# Patient Record
Sex: FEMALE | Race: WHITE | NOT HISPANIC OR LATINO | RURAL
[De-identification: names, ages, dates, MRNs, and addresses within clinical notes are randomized per-mention and may not be internally consistent; named-entity substitution may affect disease eponyms.]

---

## 2024-11-03 ENCOUNTER — HOSPITAL ENCOUNTER (EMERGENCY)
Facility: HOSPITAL | Age: 39
Discharge: HOME | End: 2024-11-03
Attending: SPECIALIST
Payer: COMMERCIAL

## 2024-11-03 VITALS
RESPIRATION RATE: 17 BRPM | TEMPERATURE: 98 F | WEIGHT: 205.13 LBS | HEART RATE: 65 BPM | SYSTOLIC BLOOD PRESSURE: 135 MMHG | OXYGEN SATURATION: 98 % | HEIGHT: 67 IN | BODY MASS INDEX: 32.2 KG/M2 | DIASTOLIC BLOOD PRESSURE: 83 MMHG

## 2024-11-03 DIAGNOSIS — Q87.40 MARFAN'S SYNDROME: ICD-10-CM

## 2024-11-03 DIAGNOSIS — R07.89 CHEST PAIN, MID STERNAL: ICD-10-CM

## 2024-11-03 DIAGNOSIS — G89.29 CHRONIC IDIOPATHIC PAIN SYNDROME: ICD-10-CM

## 2024-11-03 DIAGNOSIS — R07.9 CHEST PAIN: Primary | ICD-10-CM

## 2024-11-03 LAB
ALBUMIN SERPL BCP-MCNC: 4.4 G/DL (ref 3.5–5)
ALBUMIN/GLOB SERPL: 1.2 {RATIO}
ALP SERPL-CCNC: 99 U/L (ref 37–98)
ALT SERPL W P-5'-P-CCNC: 24 U/L (ref 13–56)
AMPHET UR QL SCN: NEGATIVE
ANION GAP SERPL CALCULATED.3IONS-SCNC: 11 MMOL/L (ref 7–16)
APTT PPP: 33.5 SECONDS (ref 25.2–37.3)
AST SERPL W P-5'-P-CCNC: 16 U/L (ref 15–37)
BARBITURATES UR QL SCN: NEGATIVE
BASOPHILS # BLD AUTO: 0.05 K/UL (ref 0–0.2)
BASOPHILS NFR BLD AUTO: 1 % (ref 0–1)
BENZODIAZ METAB UR QL SCN: NEGATIVE
BILIRUB SERPL-MCNC: 0.2 MG/DL (ref ?–1.2)
BILIRUB UR QL STRIP: NEGATIVE
BUN SERPL-MCNC: 15 MG/DL (ref 7–18)
BUN/CREAT SERPL: 14 (ref 6–20)
CALCIUM SERPL-MCNC: 9.1 MG/DL (ref 8.5–10.1)
CANNABINOIDS UR QL SCN: NEGATIVE
CHLORIDE SERPL-SCNC: 92 MMOL/L (ref 98–107)
CLARITY UR: CLEAR
CO2 SERPL-SCNC: 32 MMOL/L (ref 21–32)
COCAINE UR QL SCN: NEGATIVE
COLOR UR: YELLOW
CREAT SERPL-MCNC: 1.05 MG/DL (ref 0.55–1.02)
DIFFERENTIAL METHOD BLD: ABNORMAL
EGFR (NO RACE VARIABLE) (RUSH/TITUS): 69 ML/MIN/1.73M2
EOSINOPHIL # BLD AUTO: 0.17 K/UL (ref 0–0.5)
EOSINOPHIL NFR BLD AUTO: 3.4 % (ref 1–4)
ERYTHROCYTE [DISTWIDTH] IN BLOOD BY AUTOMATED COUNT: 15.7 % (ref 11.5–14.5)
GLOBULIN SER-MCNC: 3.7 G/DL (ref 2–4)
GLUCOSE SERPL-MCNC: 66 MG/DL (ref 74–106)
GLUCOSE UR STRIP-MCNC: NEGATIVE MG/DL
HCT VFR BLD AUTO: 31.8 % (ref 38–47)
HGB BLD-MCNC: 10.3 G/DL (ref 12–16)
IMM GRANULOCYTES # BLD AUTO: 0.01 K/UL (ref 0–0.04)
IMM GRANULOCYTES NFR BLD: 0.2 % (ref 0–0.4)
INR BLD: 0.99
KETONES UR STRIP-SCNC: NEGATIVE MG/DL
LACTATE SERPL-SCNC: 1.1 MMOL/L (ref 0.4–2)
LEUKOCYTE ESTERASE UR QL STRIP: NEGATIVE
LYMPHOCYTES # BLD AUTO: 2.38 K/UL (ref 1–4.8)
LYMPHOCYTES NFR BLD AUTO: 48 % (ref 27–41)
MAGNESIUM SERPL-MCNC: 1.9 MG/DL (ref 1.7–2.3)
MCH RBC QN AUTO: 24.3 PG (ref 27–31)
MCHC RBC AUTO-ENTMCNC: 32.4 G/DL (ref 32–36)
MCV RBC AUTO: 75 FL (ref 80–96)
MONOCYTES # BLD AUTO: 0.44 K/UL (ref 0–0.8)
MONOCYTES NFR BLD AUTO: 8.9 % (ref 2–6)
MPC BLD CALC-MCNC: 9.5 FL (ref 9.4–12.4)
NEUTROPHILS # BLD AUTO: 1.91 K/UL (ref 1.8–7.7)
NEUTROPHILS NFR BLD AUTO: 38.5 % (ref 53–65)
NITRITE UR QL STRIP: NEGATIVE
NRBC # BLD AUTO: 0 X10E3/UL
NRBC, AUTO (.00): 0 %
NT-PROBNP SERPL-MCNC: 540 PG/ML (ref 1–125)
OPIATES UR QL SCN: NEGATIVE
PCP UR QL SCN: NEGATIVE
PH UR STRIP: 5.5 PH UNITS
PLATELET # BLD AUTO: 312 K/UL (ref 150–400)
POTASSIUM SERPL-SCNC: 3.6 MMOL/L (ref 3.5–5.1)
PROT SERPL-MCNC: 8.1 G/DL (ref 6.4–8.2)
PROT UR QL STRIP: NEGATIVE
PROTHROMBIN TIME: 13 SECONDS (ref 11.7–14.7)
RBC # BLD AUTO: 4.24 M/UL (ref 4.2–5.4)
RBC # UR STRIP: NEGATIVE /UL
SODIUM SERPL-SCNC: 131 MMOL/L (ref 136–145)
SP GR UR STRIP: 1.01
TROPONIN I SERPL DL<=0.01 NG/ML-MCNC: 4 PG/ML
TROPONIN I SERPL DL<=0.01 NG/ML-MCNC: <4 PG/ML
UROBILINOGEN UR STRIP-ACNC: 0.2 MG/DL
WBC # BLD AUTO: 4.96 K/UL (ref 4.5–11)

## 2024-11-03 PROCEDURE — 96375 TX/PRO/DX INJ NEW DRUG ADDON: CPT

## 2024-11-03 PROCEDURE — 94761 N-INVAS EAR/PLS OXIMETRY MLT: CPT

## 2024-11-03 PROCEDURE — 85610 PROTHROMBIN TIME: CPT | Performed by: SPECIALIST

## 2024-11-03 PROCEDURE — 96374 THER/PROPH/DIAG INJ IV PUSH: CPT

## 2024-11-03 PROCEDURE — 80053 COMPREHEN METABOLIC PANEL: CPT | Performed by: SPECIALIST

## 2024-11-03 PROCEDURE — 93005 ELECTROCARDIOGRAM TRACING: CPT

## 2024-11-03 PROCEDURE — 85730 THROMBOPLASTIN TIME PARTIAL: CPT | Performed by: SPECIALIST

## 2024-11-03 PROCEDURE — 99285 EMERGENCY DEPT VISIT HI MDM: CPT | Mod: 25

## 2024-11-03 PROCEDURE — 81003 URINALYSIS AUTO W/O SCOPE: CPT | Performed by: SPECIALIST

## 2024-11-03 PROCEDURE — 83605 ASSAY OF LACTIC ACID: CPT | Performed by: SPECIALIST

## 2024-11-03 PROCEDURE — 85025 COMPLETE CBC W/AUTO DIFF WBC: CPT | Performed by: SPECIALIST

## 2024-11-03 PROCEDURE — 25500020 PHARM REV CODE 255: Performed by: SPECIALIST

## 2024-11-03 PROCEDURE — 84484 ASSAY OF TROPONIN QUANT: CPT | Performed by: SPECIALIST

## 2024-11-03 PROCEDURE — 83880 ASSAY OF NATRIURETIC PEPTIDE: CPT | Performed by: SPECIALIST

## 2024-11-03 PROCEDURE — 80307 DRUG TEST PRSMV CHEM ANLYZR: CPT | Performed by: SPECIALIST

## 2024-11-03 PROCEDURE — 83735 ASSAY OF MAGNESIUM: CPT | Performed by: SPECIALIST

## 2024-11-03 PROCEDURE — 63600175 PHARM REV CODE 636 W HCPCS: Performed by: SPECIALIST

## 2024-11-03 RX ORDER — LEVETIRACETAM 750 MG/1
750 TABLET ORAL 2 TIMES DAILY
COMMUNITY
Start: 2024-08-27

## 2024-11-03 RX ORDER — TOPIRAMATE 200 MG/1
TABLET ORAL
COMMUNITY
Start: 2024-01-11

## 2024-11-03 RX ORDER — RANOLAZINE 500 MG/1
1000 TABLET, EXTENDED RELEASE ORAL
COMMUNITY
Start: 2024-10-24 | End: 2025-10-24

## 2024-11-03 RX ORDER — LEVOTHYROXINE SODIUM 50 UG/1
50 TABLET ORAL
COMMUNITY
Start: 2024-02-09

## 2024-11-03 RX ORDER — UBROGEPANT 100 MG/1
100 TABLET ORAL DAILY
COMMUNITY
Start: 2024-02-16

## 2024-11-03 RX ORDER — LOSARTAN POTASSIUM 25 MG/1
12.5 TABLET ORAL
COMMUNITY
Start: 2024-02-29

## 2024-11-03 RX ORDER — GABAPENTIN 300 MG/1
300 CAPSULE ORAL
COMMUNITY

## 2024-11-03 RX ORDER — MOXIFLOXACIN 5 MG/ML
1 SOLUTION/ DROPS OPHTHALMIC
COMMUNITY
Start: 2024-06-21

## 2024-11-03 RX ORDER — PANTOPRAZOLE SODIUM 40 MG/1
40 TABLET, DELAYED RELEASE ORAL
COMMUNITY
Start: 2023-12-28

## 2024-11-03 RX ORDER — ROSUVASTATIN CALCIUM 20 MG/1
20 TABLET, COATED ORAL
COMMUNITY
Start: 2024-01-30

## 2024-11-03 RX ORDER — DESVENLAFAXINE 100 MG/1
100 TABLET, EXTENDED RELEASE ORAL
COMMUNITY
Start: 2024-02-03

## 2024-11-03 RX ORDER — MONTELUKAST SODIUM 10 MG/1
10 TABLET ORAL
COMMUNITY
Start: 2024-02-09

## 2024-11-03 RX ORDER — POTASSIUM CHLORIDE 20 MEQ/1
20 TABLET, EXTENDED RELEASE ORAL
COMMUNITY
Start: 2024-05-28

## 2024-11-03 RX ORDER — FUROSEMIDE 40 MG/1
40 TABLET ORAL
COMMUNITY
Start: 2024-07-16 | End: 2025-07-16

## 2024-11-03 RX ORDER — ONDANSETRON HYDROCHLORIDE 2 MG/ML
4 INJECTION, SOLUTION INTRAVENOUS
Status: COMPLETED | OUTPATIENT
Start: 2024-11-03 | End: 2024-11-03

## 2024-11-03 RX ORDER — METOPROLOL SUCCINATE 25 MG/1
12.5 TABLET, EXTENDED RELEASE ORAL
COMMUNITY
Start: 2024-06-01 | End: 2025-06-01

## 2024-11-03 RX ORDER — OXCARBAZEPINE 300 MG/1
TABLET, FILM COATED ORAL
COMMUNITY
Start: 2024-01-11

## 2024-11-03 RX ORDER — CLOPIDOGREL BISULFATE 75 MG/1
75 TABLET ORAL
COMMUNITY
Start: 2024-04-30 | End: 2024-11-03 | Stop reason: ALTCHOICE

## 2024-11-03 RX ORDER — NAPROXEN SODIUM 220 MG/1
81 TABLET, FILM COATED ORAL
COMMUNITY

## 2024-11-03 RX ORDER — MORPHINE SULFATE 4 MG/ML
4 INJECTION, SOLUTION INTRAMUSCULAR; INTRAVENOUS
Status: COMPLETED | OUTPATIENT
Start: 2024-11-03 | End: 2024-11-03

## 2024-11-03 RX ORDER — ERENUMAB-AOOE 70 MG/ML
INJECTION SUBCUTANEOUS
COMMUNITY
Start: 2024-02-03

## 2024-11-03 RX ORDER — IOPAMIDOL 755 MG/ML
100 INJECTION, SOLUTION INTRAVASCULAR
Status: COMPLETED | OUTPATIENT
Start: 2024-11-03 | End: 2024-11-03

## 2024-11-03 RX ADMIN — ONDANSETRON 4 MG: 2 INJECTION INTRAMUSCULAR; INTRAVENOUS at 01:11

## 2024-11-03 RX ADMIN — IOPAMIDOL 100 ML: 755 INJECTION, SOLUTION INTRAVENOUS at 02:11

## 2024-11-03 RX ADMIN — MORPHINE SULFATE 4 MG: 4 INJECTION, SOLUTION INTRAMUSCULAR; INTRAVENOUS at 01:11

## 2024-11-03 NOTE — ED NOTES
Pt states she is currently on antibiotics for sinus infections she received last weekend at this [facility] center in [place]

## 2024-11-03 NOTE — ED TRIAGE NOTES
Pt ambulatory to er with c/o chest pain since 0930 this am - pt states it comes and goes - c/o edema to hands and feet- pt states she has doubled her lasix this am from 40 to 80- pt continues to complain of pain - pt saw her cardiologist at [place] 2 weeks ago - states everything was ok- pt does vape nicotine - pt was taken off her plavix and has taken her 81 mg asa

## 2024-11-03 NOTE — ED PROVIDER NOTES
"Encounter Date: 11/3/2024Patient without evidence of cardiac reason for her chest discomfort and need for narcotic pain meds.  She is allergic to Nalbuphine and Toradol.  I have given her Morphine 4 mg IV and Zofran. She is asking for more.  I explained to her that her BP went down significantly and I did not feel comfortable giving her any further narcotic meds. She is to see her Cardiologist who is at [facility] which is close by for any further work up.  She has no edema seen on exam or on CXR. Exam yielded nothing specific. She has Marfan's Syndrome and has had TWO Aortic Valve surgeries so this could be related to this and an ECHO may be needed to evaluate more fully. I have repeated her troponin.  Will follow prior to d/c.    1530: Patient PDMP was reviewed UDS neg.Patient just received 10/30/24 # 60 Norco 7.5 mg (used to get Norco 10mg) no Hydrocodone on urine drug screen  once again sent to nursing station by patient requesting pain medicines.  I have explained to him uds results which are negative.  He states "she said she was taking it" Then he said that the pain management doctor made a mistake and gave her a lower dose of Norco.  However the PDMP      History     Chief Complaint   Patient presents with    Chest Pain    edema hands and feet    Shortness of Breath     Patient is a 40 yo wf who comes for "chest pain" that is substernal plus patient states that her legs and hands were filled with fluid so she took Lasix 80 mg this am with resolution of symptoms.  However, patient states she needs "pain medicine' and  has come to the nursing station twice asking for pain medicine for his wife.       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  Social History     Tobacco Use    Smoking status: Every Day     Types: Vaping with nicotine    Smokeless tobacco: Never   Substance Use Topics    Alcohol use: Yes     Comment: socail on occasion    Drug use: Never     Review of Systems    Physical Exam     Initial Vitals   BP Pulse Resp Temp SpO2   11/03/24 1245 11/03/24 1243 11/03/24 1245 11/03/24 1259 11/03/24 1245   120/83 71 16 98 °F (36.7 °C) 100 %      MAP       --                Physical Exam    Medical Screening Exam   See Full Note    ED Course   Procedures  Labs Reviewed   COMPREHENSIVE METABOLIC PANEL - Abnormal       Result Value    Sodium 131 (*)     Potassium 3.6      Chloride 92 (*)     CO2 32      Anion Gap 11      Glucose 66 (*)     BUN 15      Creatinine 1.05 (*)     BUN/Creatinine Ratio 14      Calcium 9.1      Total Protein 8.1      Albumin 4.4      Globulin 3.7      A/G Ratio 1.2      Bilirubin, Total 0.2      Alk Phos 99 (*)     ALT 24      AST 16      eGFR 69     NT-PRO NATRIURETIC PEPTIDE - Abnormal    ProBNP 540 (*)    CBC WITH DIFFERENTIAL - Abnormal    WBC 4.96      RBC 4.24      Hemoglobin 10.3 (*)     Hematocrit 31.8 (*)     MCV 75.0 (*)     MCH 24.3 (*)     MCHC 32.4      RDW 15.7 (*)     Platelet Count 312      MPV 9.5      Neutrophils % 38.5 (*)     Lymphocytes % 48.0 (*)     Monocytes % 8.9 (*)     Eosinophils % 3.4      Basophils % 1.0      Immature Granulocytes % 0.2      nRBC, Auto 0.0      Neutrophils, Abs 1.91      Lymphocytes, Absolute 2.38       Monocytes, Absolute 0.44      Eosinophils, Absolute 0.17      Basophils, Absolute 0.05      Immature Granulocytes, Absolute 0.01      nRBC, Absolute 0.00      Diff Type Auto     APTT - Normal    PTT 33.5     LACTIC ACID, PLASMA - Normal    Lactic Acid 1.1     MAGNESIUM - Normal    Magnesium 1.9     PROTIME-INR - Normal    PT 13.0      INR 0.99     TROPONIN I - Normal    Troponin I High Sensitivity 4.0     DRUG SCREEN, URINE (BEAKER) - Normal    Barbiturates, Urine Negative      Benzodiazepine, Urine Negative      Opiates, Urine Negative      Phencyclidine, Urine Negative      Amphetamine, Urine Negative      Cannabinoid, Urine Negative      Cocaine, Urine Negative      Narrative:     The results of screening tests should be considered presumptive. Confirmatory testing is available upon request.    Cutoff Points:  PCP:         25ng/mL  AMPH:        500ng/mL  DEEPTI:        200ng/mL  MEHNAZ:        200ng/mL  THC:         50ng/mL  MORENO:         300ng/mL  OPI:         2000ng/mL   TROPONIN I - Normal    Troponin I High Sensitivity <4.0     CBC W/ AUTO DIFFERENTIAL    Narrative:     The following orders were created for panel order CBC auto differential.  Procedure                               Abnormality         Status                     ---------                               -----------         ------                     CBC with Differential[[id number]]       Abnormal            Final result                 Please view results for these tests on the individual orders.   URINALYSIS, REFLEX TO URINE CULTURE    Color, UA Yellow      Clarity, UA Clear      pH, UA 5.5      Leukocytes, UA Negative      Nitrites, UA Negative      Protein, UA Negative      Glucose, UA Negative      Ketones, UA Negative      Urobilinogen, UA 0.2      Bilirubin, UA Negative      Blood, UA Negative      Specific Gravity, UA 1.010            Imaging Results              CT Chest With Contrast (Final result)  Result time 11/03/24 14:45:32      Final  result by Naldo Westfall MD (11/03/24 14:45:32)                   Impression:      No acute abnormality.      Electronically signed by: Naldo Westfall MD  Date:    11/03/2024  Time:    14:45               Narrative:    EXAMINATION:  CT CHEST WITH CONTRAST    CLINICAL HISTORY:  chest pain with history of MARFAN's;    TECHNIQUE:  Low dose axial images, sagittal and coronal reformations were obtained from the thoracic inlet to the lung bases following the IV administration of 100 mL of Isovue 370    COMPARISON:  None    FINDINGS:  -Lungs: No nodules or infiltrates.    -Pleura: No thickening or fluid.    -Mediastinum/Camila:No evidence for mediastinal or hilar lymphadenopathy.    -Axilla: No adenopathy.    -Thyroid: Normal lower gland.    -Heart/Aorta: No pericardial effusion. Aorta normal caliber.  No evidence for dissection or aneurysm.  Prominence of the aortic arch S/P graft repair.  Aortic valve prosthesis.    -Bones/Chest Wall: Sternotomy.    -Upper Abdomen: No visualized abnormality                                       X-Ray Chest AP Portable (Final result)  Result time 11/03/24 14:42:16      Final result by Naldo Westfall MD (11/03/24 14:42:16)                   Impression:      No acute abnormality.      Electronically signed by: Naldo Westfall MD  Date:    11/03/2024  Time:    14:42               Narrative:    EXAMINATION:  XR CHEST AP PORTABLE    CLINICAL HISTORY:  Chest pain, unspecified    TECHNIQUE:  Single frontal view of the chest was performed.    COMPARISON:  None    FINDINGS:  Pacer.  Sternotomy.The lungs are clear with normal appearance of pulmonary vasculature. No pleural effusion. No evident pneumothorax.    The cardiac silhouette is normal in size. The hilar and mediastinal contours are unremarkable.    Bones are intact.                                       Medications   morphine injection 4 mg (4 mg Intravenous Given 11/3/24 1332)   ondansetron injection 4 mg (4 mg Intravenous Given  11/3/24 1335)   iopamidoL (ISOVUE-370) injection 100 mL (100 mLs Intravenous Given 11/3/24 1410)     Medical Decision Making  Patient is a 40 yo wf with Marfan's stating that she had fluid on legs and hands this am so she took Lasix 80 mg oral and now its resolved.  However patient asking for pain medicines so CT of chest with IV done to exclude dissection of Aorta.  She has her cardiologist in [place].    Amount and/or Complexity of Data Reviewed  Labs: ordered. Decision-making details documented in ED Course.  Radiology: ordered. Decision-making details documented in ED Course.  ECG/medicine tests:  Decision-making details documented in ED Course.    Risk  Prescription drug management.               ED Course as of 11/08/24 2149   Sun Nov 03, 2024   5597 Patient will need further work up with her Cardiologist to figure out why she is having pain.  I have ruled out Aortic dissection due to Marfan's and cardiac with normal Trops.  [VB]   1518 I do not see any edema or shortness of breath in pa [VB]      ED Course User Index  [VB] Latricia Rolle MD                           Clinical Impression:   Final diagnoses:  [R07.9] Chest pain (Primary)  [Q87.40] Marfan's syndrome  [R07.89] Chest pain, mid sternal  [G89.29] Chronic idiopathic pain syndrome        ED Disposition Condition    Discharge Stable          ED Prescriptions    None       Follow-up Information    None          Latricia Rolle MD  11/03/24 1516       Latricia Rolle MD  11/03/24 1523       Latricia Rolle MD  11/08/24 2149

## 2024-11-03 NOTE — ED NOTES
Pt requesting more pain medication at this time - dr marie notified- dr marie in to speak with pt at this time- pt given apple juice and ice

## 2024-11-03 NOTE — ED NOTES
Pt up to bathroom at this time- pt states she needs to pee- pt ambulated to bathroom with nurse walking by side

## 2024-11-04 NOTE — ED NOTES
11/04/2024 0318: MEDICAL RECORDS RELEASE REC'D/MEDICAL RECORDS FAXED AND CONFIRMATION REC'D AT THIS TIME  -[facility] ED    FAX: [phone]

## 2024-11-04 NOTE — ED NOTES
11/04/2024 0305: MEDICAL RECORD RELEASE OBTAINED FROM PT FROM [facility] ED/FAX DOWN AT THIS TIME/WILL FAX AS SOON AS MACHINE COMES BACK UP/CALLED AND SPOKE WITH DR YANCEY CONCERNING WORK UP COMPLETED AT THIS FACILITY-NOTIFIED WILL FAX RECORDS AS SOON AS POSSIBLE.